# Patient Record
Sex: MALE | Race: BLACK OR AFRICAN AMERICAN | NOT HISPANIC OR LATINO | Employment: OTHER | URBAN - METROPOLITAN AREA
[De-identification: names, ages, dates, MRNs, and addresses within clinical notes are randomized per-mention and may not be internally consistent; named-entity substitution may affect disease eponyms.]

---

## 2018-12-19 ENCOUNTER — TELEPHONE (OUTPATIENT)
Dept: TRANSPLANT | Facility: CLINIC | Age: 58
End: 2018-12-19

## 2019-01-08 DIAGNOSIS — Z76.82 ORGAN TRANSPLANT CANDIDATE: Primary | ICD-10-CM

## 2019-01-24 ENCOUNTER — HOSPITAL ENCOUNTER (OUTPATIENT)
Dept: RADIOLOGY | Facility: HOSPITAL | Age: 59
Discharge: HOME | End: 2019-01-24
Attending: NURSE PRACTITIONER
Payer: MEDICARE

## 2019-01-24 ENCOUNTER — CLINICAL SUPPORT (OUTPATIENT)
Dept: INFECTIOUS DISEASES | Facility: CLINIC | Age: 59
End: 2019-01-24
Payer: MEDICARE

## 2019-01-24 ENCOUNTER — OFFICE VISIT (OUTPATIENT)
Dept: TRANSPLANT | Facility: CLINIC | Age: 59
End: 2019-01-24
Payer: MEDICARE

## 2019-01-24 ENCOUNTER — TELEPHONE (OUTPATIENT)
Dept: TRANSPLANT | Facility: CLINIC | Age: 59
End: 2019-01-24

## 2019-01-24 ENCOUNTER — SOCIAL WORK (OUTPATIENT)
Dept: TRANSPLANT | Facility: CLINIC | Age: 59
End: 2019-01-24

## 2019-01-24 VITALS
HEART RATE: 102 BPM | SYSTOLIC BLOOD PRESSURE: 157 MMHG | HEIGHT: 72 IN | OXYGEN SATURATION: 95 % | TEMPERATURE: 98 F | DIASTOLIC BLOOD PRESSURE: 88 MMHG | BODY MASS INDEX: 26.57 KG/M2 | RESPIRATION RATE: 18 BRPM | WEIGHT: 196.19 LBS

## 2019-01-24 DIAGNOSIS — Z01.818 PRE-TRANSPLANT EVALUATION FOR CHRONIC KIDNEY DISEASE: Primary | ICD-10-CM

## 2019-01-24 DIAGNOSIS — Z76.82 ORGAN TRANSPLANT CANDIDATE: ICD-10-CM

## 2019-01-24 DIAGNOSIS — Z99.2 ESRD ON HEMODIALYSIS: ICD-10-CM

## 2019-01-24 DIAGNOSIS — Z87.891 SMOKING HX: ICD-10-CM

## 2019-01-24 DIAGNOSIS — E78.49 OTHER HYPERLIPIDEMIA: ICD-10-CM

## 2019-01-24 DIAGNOSIS — I15.0 RENOVASCULAR HYPERTENSION: ICD-10-CM

## 2019-01-24 DIAGNOSIS — J44.9 CHRONIC OBSTRUCTIVE PULMONARY DISEASE, UNSPECIFIED COPD TYPE: ICD-10-CM

## 2019-01-24 DIAGNOSIS — I73.9 PAD (PERIPHERAL ARTERY DISEASE): ICD-10-CM

## 2019-01-24 DIAGNOSIS — I25.10 CORONARY ARTERY DISEASE, ANGINA PRESENCE UNSPECIFIED, UNSPECIFIED VESSEL OR LESION TYPE, UNSPECIFIED WHETHER NATIVE OR TRANSPLANTED HEART: ICD-10-CM

## 2019-01-24 DIAGNOSIS — N18.6 ESRD ON HEMODIALYSIS: ICD-10-CM

## 2019-01-24 DIAGNOSIS — N18.6 ANEMIA IN ESRD (END-STAGE RENAL DISEASE): ICD-10-CM

## 2019-01-24 DIAGNOSIS — D63.1 ANEMIA IN ESRD (END-STAGE RENAL DISEASE): ICD-10-CM

## 2019-01-24 PROCEDURE — 76770 US RETROPERITONEAL COMPLETE: ICD-10-PCS | Mod: 26,TXP,, | Performed by: RADIOLOGY

## 2019-01-24 PROCEDURE — 71046 X-RAY EXAM CHEST 2 VIEWS: CPT | Mod: TC,FY,TXP

## 2019-01-24 PROCEDURE — 76770 US EXAM ABDO BACK WALL COMP: CPT | Mod: 26,TXP,, | Performed by: RADIOLOGY

## 2019-01-24 PROCEDURE — 71046 X-RAY EXAM CHEST 2 VIEWS: CPT | Mod: 26,TXP,, | Performed by: RADIOLOGY

## 2019-01-24 PROCEDURE — 76770 US EXAM ABDO BACK WALL COMP: CPT | Mod: TC,TXP

## 2019-01-24 PROCEDURE — 99213 OFFICE O/P EST LOW 20 MIN: CPT | Mod: PBBFAC,25,TXP | Performed by: NURSE PRACTITIONER

## 2019-01-24 PROCEDURE — 99205 PR OFFICE/OUTPT VISIT, NEW, LEVL V, 60-74 MIN: ICD-10-PCS | Mod: S$PBB,TXP,, | Performed by: NURSE PRACTITIONER

## 2019-01-24 PROCEDURE — 93978 VASCULAR STUDY: CPT | Mod: 26,TXP,, | Performed by: RADIOLOGY

## 2019-01-24 PROCEDURE — 93978 US DOPP ILIACS BILATERAL: ICD-10-PCS | Mod: 26,TXP,, | Performed by: RADIOLOGY

## 2019-01-24 PROCEDURE — 72170 X-RAY EXAM OF PELVIS: CPT | Mod: TC,FY,TXP

## 2019-01-24 PROCEDURE — 71046 XR CHEST PA AND LATERAL: ICD-10-PCS | Mod: 26,TXP,, | Performed by: RADIOLOGY

## 2019-01-24 PROCEDURE — 90471 IMMUNIZATION ADMIN: CPT | Mod: PBBFAC,TXP

## 2019-01-24 PROCEDURE — 99205 OFFICE O/P NEW HI 60 MIN: CPT | Mod: S$PBB,TXP,, | Performed by: NURSE PRACTITIONER

## 2019-01-24 PROCEDURE — 99999 PR PBB SHADOW E&M-EST. PATIENT-LVL III: CPT | Mod: PBBFAC,TXP,, | Performed by: NURSE PRACTITIONER

## 2019-01-24 PROCEDURE — 93978 VASCULAR STUDY: CPT | Mod: TC,TXP

## 2019-01-24 PROCEDURE — 72170 XR PELVIS ROUTINE AP: ICD-10-PCS | Mod: 26,TXP,, | Performed by: RADIOLOGY

## 2019-01-24 PROCEDURE — 99244 OFF/OP CNSLTJ NEW/EST MOD 40: CPT | Mod: S$PBB,TXP,, | Performed by: TRANSPLANT SURGERY

## 2019-01-24 PROCEDURE — 99999 PR PBB SHADOW E&M-EST. PATIENT-LVL III: ICD-10-PCS | Mod: PBBFAC,TXP,, | Performed by: NURSE PRACTITIONER

## 2019-01-24 PROCEDURE — 99244 PR OFFICE CONSULTATION,LEVEL IV: ICD-10-PCS | Mod: S$PBB,TXP,, | Performed by: TRANSPLANT SURGERY

## 2019-01-24 PROCEDURE — 72170 X-RAY EXAM OF PELVIS: CPT | Mod: 26,TXP,, | Performed by: RADIOLOGY

## 2019-01-24 RX ORDER — OMEPRAZOLE 20 MG/1
20 CAPSULE, DELAYED RELEASE ORAL DAILY
Refills: 11 | COMMUNITY
Start: 2019-01-02

## 2019-01-24 RX ORDER — RISPERIDONE 0.5 MG/1
0.5 TABLET ORAL 2 TIMES DAILY
Refills: 1 | COMMUNITY
Start: 2019-01-17

## 2019-01-24 RX ORDER — CARVEDILOL 25 MG/1
25 TABLET ORAL 2 TIMES DAILY
Refills: 3 | COMMUNITY
Start: 2018-11-05

## 2019-01-24 RX ORDER — HYDRALAZINE HYDROCHLORIDE 50 MG/1
50 TABLET, FILM COATED ORAL 2 TIMES DAILY
Refills: 6 | COMMUNITY
Start: 2019-01-04

## 2019-01-24 RX ORDER — SERTRALINE HYDROCHLORIDE 25 MG/1
25 TABLET, FILM COATED ORAL DAILY
Refills: 1 | COMMUNITY
Start: 2018-11-01

## 2019-01-24 RX ORDER — SEVELAMER CARBONATE 800 MG/1
800 TABLET, FILM COATED ORAL 3 TIMES DAILY
COMMUNITY
Start: 2018-11-19

## 2019-01-24 RX ORDER — NIFEDIPINE 90 MG/1
90 TABLET, EXTENDED RELEASE ORAL DAILY
Refills: 3 | COMMUNITY
Start: 2018-11-05

## 2019-01-24 RX ORDER — ASCORBIC ACID, THIAMINE MONONITRATE,RIBOFLAVIN, NIACINAMIDE, PYRIDOXINE HYDROCHLORIDE, FOLIC ACID, CYANOCOBALAMIN, BIOTIN, CALCIUM PANTOTHENATE, 100; 1.5; 1.7; 20; 10; 1; 6000; 150000; 5 MG/1; MG/1; MG/1; MG/1; MG/1; MG/1; UG/1; UG/1; MG/1
1 CAPSULE, LIQUID FILLED ORAL DAILY
Refills: 11 | COMMUNITY
Start: 2019-01-02

## 2019-01-24 RX ORDER — HYDROCODONE BITARTRATE AND ACETAMINOPHEN 7.5; 325 MG/1; MG/1
1 TABLET ORAL EVERY 6 HOURS PRN
Refills: 0 | COMMUNITY
Start: 2019-01-21

## 2019-01-24 RX ORDER — SIMVASTATIN 20 MG/1
20 TABLET, FILM COATED ORAL NIGHTLY
Refills: 0 | COMMUNITY
Start: 2018-11-01

## 2019-01-24 RX ORDER — ALBUTEROL SULFATE 90 UG/1
2 AEROSOL, METERED RESPIRATORY (INHALATION)
Refills: 5 | COMMUNITY
Start: 2019-01-17

## 2019-01-24 NOTE — PROGRESS NOTES
"Pre Transplant Infectious Diseases Consult  Kidney Transplant Recipient Evaluation        Reason for Visit:    Chief Complaint   Patient presents with    Kidney Transplant Evaluation     History of Present Illness  Brandon Justice is a 58 y.o. year old Black or  male with advanced Kidney disease currently being evaluated for Kidney transplant.  Patient denies any recent fever, chills, or infective illnesses. On HD through fistula w/o problems.  Pt recently w pneumonia treated w/ abx uncertain what kind.      1) Do you have a history of:   YES NO   Diabetes      [] [x]     Diabetic Foot Infection/Bone Infection  []        [x]     Surgical Removal of Spleen   []  [x]                  2) Have you had recurrent infections involving:             YES NO  Sinus infections  []         [x]   Sore Throat   []         [x]                 Prostate Infections  []         [x]              Bladder Infections  []         [x]                     Kidney Infections  []         [x]                               Intestinal Infections  []         [x]      Skin Infections   []         [x]       Reproductive Infections          []  [x]   Periodontal Disease  []         [x]        3)Have you ever had: YES     NO UNKNOWN      Chicken Pox   [x]         []  []   Shingles   []         [x]  []   Orolabial Herpes             []  [x]  []   Genital Herpes  []         [x]  []   Cytomegalovirus  []         [x]  []   Anastacia-Barr Virus  []         [x]  []   Genital Warts   []         [x]  []   Hepatitis A   []         [x]  []   Hepatitis B   []         [x]  []   Hepatitis C   []         [x]  []   Syphilis   []         [x]  []   Gonorrhea   [x]         []  []   Pelvic Inflammatory   Disease   []         [x]  []   Chlamydia Infection  []         [x]  []   Intestinal parasites   or worms   []         [x]  []   Fungal Infections  []         [x]  []   Blood Infections  []         [x]  []       Comment:  "Clap" in the 60s - treated      4) " Have you ever been exposed   YES NO  To someone with tuberculosis?  []   [x]   If yes, what treatment did you receive:   Pt reports being in long-term for 9 months.  TB skin test negative before    5) What states have you lived in? [place], [place]    6) What countries have you visited for more than 2 weeks?    No                     YES NO  7) Did you have any associated infections?  []  [x]       8) Are you planning to travel outside the    []  [x]   [place] after your transplant?    9) Household                   YES NO  Do you have pets living in your house?    [x]         []   If yes, describe: fully immunized dog    Do you spend time or live on a farm or     [x]         []   have livestock or other farm animals?  If yes, which ones: Horse    Do you have a fish tank?          []  [x]       Do you have a litter box?      []         [x]     Do you fish or hunt?       [x]         []     Do you clean or skin fish or animals?    []         [x]     Do you consume raw or undercooked    []         [x]   meat, fish, or shellfish?      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          12)Do you garden or otherwise  YES NO   work in the soil?    [x]         []   13)Do you hike, camp, or spend     time in wooded areas?   [x]         []         14) The patient's immunization history was reviewed.    Have you ever received:  YES NO UNKNOWN DATES   Routine Childhood vaccines  [x]         []  []      Influenza vaccine   [x]  []  []    10/18   Pneumovax    [x]  []  []    10/18    Tetanus-diptheria   [x]         []  []       4 or 5 yrs ago   Hepatitis A vaccine series       []  [x]  []    Hepatitis B vaccine series         [x]  []  []           2018   Meningitis vaccine   []         []  []    Varicella vaccine   []         []  []        Based on the patients immunization history and serologies, immunizations were ordered:         Ordered  Not Ordered  Influenza Vaccine     []     [x]   Hepatitis A series at 0,  6 months   [x]    []    Rx given  Hepatitis B seriesat 0, 1, and 6 months  []    [x]   Hepatitis B High Dose 0,1, and 6 months  []    [x]     Prevnar      []    [x]    Rx Given  Pneumovax      []    [x]    TDap       []    [x]    Zoster       []    [x]   Menactra      []    [x]   Shingrix      []    [x]   Rx given       The patient was encouraged to contact us about any problems that may develop after immunization and possible side effects were reviewed.      Previous Transplant: no    Etiology of Kidney Disease: HTN    Allergies: Iodine and iodide containing products; Zithromax [azithromycin]; and Pcn [penicillins]    There is no immunization history on file for this patient.  Past Medical History:   Diagnosis Date    Anemia     Anxiety     Cardiomyopathy     Chronic idiopathic thrombocytopenia     COPD (chronic obstructive pulmonary disease)     Coronary artery disease     Depression     Disorder of kidney and ureter     Hyperlipidemia     Hypertension     PAD (peripheral artery disease)     Pneumonia     Moctezuma-Alek syndrome      Past Surgical History:   Procedure Laterality Date    arm surgery      AV FISTULA PLACEMENT      CARDIAC CATHETERIZATION        Social History     Socioeconomic History    Marital status:      Spouse name: Not on file    Number of children: Not on file    Years of education: Not on file    Highest education level: Not on file   Social Needs    Financial resource strain: Not on file    Food insecurity - worry: Not on file    Food insecurity - inability: Not on file    Transportation needs - medical: Not on file    Transportation needs - non-medical: Not on file   Occupational History    Not on file   Tobacco Use    Smoking status: Former Smoker     Packs/day: 2.00     Years: 38.00     Pack years: 76.00     Types: Cigarettes     Last attempt to quit: 1/24/2009     Years since quitting: 10.0    Smokeless tobacco:  Never Used   Substance and Sexual Activity    Alcohol use: Not on file    Drug use: Not on file    Sexual activity: Not on file   Other Topics Concern    Not on file   Social History Narrative    Not on file       Review of Systems   Constitution: Positive for decreased appetite and malaise/fatigue. Negative for chills, diaphoresis, fever, weakness, night sweats, weight gain and weight loss.   HENT: Negative for congestion, ear pain, hearing loss, sore throat, stridor and tinnitus.    Eyes: Negative for blurred vision, double vision and redness.   Cardiovascular: Negative for chest pain, leg swelling and palpitations.   Respiratory: Positive for shortness of breath and wheezing. Negative for cough, hemoptysis and sputum production.    Hematologic/Lymphatic: Negative for adenopathy. Does not bruise/bleed easily.   Skin: Negative for dry skin, itching, rash and suspicious lesions.   Musculoskeletal: Positive for arthritis and joint pain. Negative for back pain, joint swelling, myalgias and neck pain.   Gastrointestinal: Negative for abdominal pain, constipation, diarrhea, heartburn, nausea and vomiting.   Genitourinary: Negative for bladder incontinence, dysuria, flank pain, frequency, hesitancy, incomplete emptying and urgency.   Neurological: Negative for dizziness, focal weakness, headaches, loss of balance and numbness.   Psychiatric/Behavioral: Negative for depression and memory loss. The patient does not have insomnia and is not nervous/anxious.      Physical Exam   Constitutional: He is oriented to person, place, and time. He appears well-developed and well-nourished.   HENT:   Head: Normocephalic and atraumatic.   Mouth/Throat: Uvula is midline, oropharynx is clear and moist and mucous membranes are normal. He has dentures. No oral lesions. Normal dentition. No dental abscesses, lacerations or dental caries.   Eyes: Conjunctivae and lids are normal. Pupils are equal, round, and reactive to light. No  scleral icterus.   Neck: Neck supple.   Cardiovascular: Normal rate and regular rhythm. Exam reveals no gallop and no friction rub.   No murmur heard.  Pulmonary/Chest: Effort normal. No respiratory distress. He has no decreased breath sounds. He has no wheezes. He has no rhonchi. He has rales.   Abdominal: Soft. Normal appearance, normal aorta and bowel sounds are normal. He exhibits no distension and no mass. There is no hepatosplenomegaly. There is no tenderness. There is no rebound and no guarding.   Musculoskeletal: He exhibits no edema.   Lymphadenopathy:        Head (right side): No submental, no submandibular, no tonsillar, no preauricular, no posterior auricular and no occipital adenopathy present.        Head (left side): No submental, no submandibular, no tonsillar, no preauricular, no posterior auricular and no occipital adenopathy present.     He has no cervical adenopathy.     He has no axillary adenopathy.        Right: No inguinal, no supraclavicular and no epitrochlear adenopathy present.        Left: No inguinal, no supraclavicular and no epitrochlear adenopathy present.   Neurological: He is alert and oriented to person, place, and time. No cranial nerve deficit.   Skin: Skin is warm, dry and intact. No lesion and no rash noted. He is not diaphoretic. No erythema. No pallor.   Psychiatric: He has a normal mood and affect. His behavior is normal.     Diagnostics: No results found for: RPR  No results found for: CMVANTIBODIE  No results found for: HIV1X2  No results found for: HTLVIIIANTIB  No results found for: HEPBSAG  No results found for: HEPBCAB  No results found for: HEPCAB  No results found for: TOXOIGG  No components found for: TOXOIGGINTER  No results found for: YBP3ZKD  No results found for: PQE7RRE  No results found for: VARICELLAZOS  No results found for: VARICELLAINT  No results found for: STRONGANTIGG  No results found for: EPSTEINBARRV  No results found for: HEPBSAB  No results found  for: QUANTIFERON  No results found for: HEPAIGM  No results found for: PPD  No results found for this or any previous visit.         Transplant Infectious Diseases - Candidacy    Assessment/Plan:     Transplant Candidacy: Based on available information, there are no identified significant barriers to transplantation from an infectious disease standpoint pending acceptable serologies.  Final determination of transplant candidacy will be made once evaluation is complete and reviewed by the Transplant Selection Committee.  Quantiferon gold, HIV, strongyloides IgG and RPR pending. If positive, please refer to ID clinic.      - Prevnar 13 Rx given   - Hepatitis A vaccine today and in 6 months rx given  - Shingrix Day 0 and 2 months Rx given      Kamlesh Nath PA-C         Counseling:I discussed with Brandon the risk for increased susceptibility to infections following transplantation including increased risk for infection right after transplant and if rejection should occur.  The patients has been counseled on the importance of vaccinations including but not limited to a yearly flu vaccine.  Specific guidance has been provided to the patient regarding the patients occupation, hobbies and activities to avoid future infectious complications including but not limited to avoiding undercooked meats and seafood, proper hygiene, and contact with animals.

## 2019-01-24 NOTE — LETTER
January 25, 2019        Bayron Montoya  [address]  [address]  [place] [address]  Phone: [phone]  Fax: [phone]             [facility]  [address]  [facility] [address]  Phone: [phone]   Patient: Brandon Justice   MR Number: [id number]   YOB: 1960   Date of Visit: 1/24/2019       Dear Dr. Bayron Montoya    Thank you for referring Brandon Justice to me for evaluation. Attached you will find relevant portions of my assessment and plan of care.    If you have questions, please do not hesitate to call me. I look forward to following Brandon Justice along with you.    Sincerely,    Re Gooden, NP    Enclosure    If you would like to receive this communication electronically, please contact [email or web address] or [phone] to request Owned it Link access.    Owned it Link is a tool which provides read-only access to select patient information with whom you have a relationship. Its easy to use and provides real time access to review your patients record including encounter summaries, notes, results, and demographic information.    If you feel you have received this communication in error or would no longer like to receive these types of communications, please e-mail [email or web address]

## 2019-01-24 NOTE — PROGRESS NOTES
"   Transplant Nephrology  Kidney Transplant Recipient Evaluation    Referring Physician: Bayron Montoya  Current Nephrologist: Bayron Montoya    Subjective:   CC:  Initial evaluation of kidney transplant candidacy.    HPI:  Mr. Justice is a 58 y.o. year old Black or  male who has presented to be evaluated as a potential kidney transplant recipient.  He has ESRD secondary to HTN.  Patient is currently on hemodialysis started on ~ 2/2008. Patient is dialyzing on TTS schedule.  Patient reports that he is tolerating dialysis well. Dialysis session 3 1/2 hours. States BP remains stable during dialysis. Denies dialysis access clotting problems.  He has a LUE AV fistula for dialysis access.     Previous Transplant: no    Past Medical and Surgical History: Mr. Justice  has a past medical history of Anemia, Anxiety, Cardiomyopathy, Chronic idiopathic thrombocytopenia, COPD (chronic obstructive pulmonary disease), Coronary artery disease, Depression, Disorder of kidney and ureter, Hyperlipidemia, Hypertension, PAD (peripheral artery disease), Pneumonia, and Moctezuma-Alek syndrome.  He has a past surgical history that includes Cardiac catheterization; arm surgery; and AV fistula placement.    Past Social and Family History: Mr. Justice reports that he quit smoking about 10 years ago. His smoking use included cigarettes. He has a 76.00 pack-year smoking history. he has never used smokeless tobacco. His family history includes Diabetes in his brother and sister; Heart disease in his brother, father, and mother; Hypertension in his brother, father, mother, and sister; Stroke in his mother.      CAD, s/p angiogram in 5/2018  Noting CAD, PAD w/ significant disease in the bilateral internal iliac arteries. Common and external noted to be "fine."  F/u with [facility] [place]  Listed for 12 year [facility]     Smoking HX  Started smoking at age 10 and quit at age 48. Reports smoking ~ 2 packs per " day                   Reported per [facility] a 30+ pack year smoking HX w/ COPD  F/U with DR Woodall / pulmonologist in [place], [place]  The patient states he is getting over pneumonia ~ 1 month ago. He completed antibiotics. He is f/u with his PCP, Dr Weldon.         Reports Kris Alek Syndrome reaction  on 2 occassions due iodine contrast  Last reaction was after getting heart cath in 5/2018  Reports Hospitalization at [facility]      FX assessment   Does not appear frail  Keeps busy , works in a body shop and paints cars.   He continues to do yard work and takes care of his horse.   Denies SOB , chest pain or leg pain.      Donors--no  Kidney bx-- reports bx at [facility] ~ 1 1/2 years ago      Past Medical History:   Diagnosis Date    Anemia     Anxiety     Cardiomyopathy     Chronic idiopathic thrombocytopenia     COPD (chronic obstructive pulmonary disease)     Coronary artery disease     Depression     Disorder of kidney and ureter     Hyperlipidemia     Hypertension     PAD (peripheral artery disease)     Pneumonia     Moctezuma-Alek syndrome        Review of Systems   Constitutional: Negative for activity change, appetite change, chills, fatigue, fever and unexpected weight change.   HENT: Negative for congestion, facial swelling, postnasal drip, rhinorrhea, sinus pressure, sore throat and trouble swallowing.    Eyes: Negative for pain, redness and visual disturbance.   Respiratory: Positive for cough. Negative for chest tightness, shortness of breath and wheezing.         COPD w/ chronic cough   Cardiovascular: Positive for leg swelling. Negative for chest pain and palpitations.   Gastrointestinal: Negative for abdominal pain, diarrhea, nausea and vomiting.   Genitourinary: Positive for decreased urine volume. Negative for dysuria, flank pain and urgency.         Still makes urine   Musculoskeletal: Positive for back pain. Negative for gait problem, neck pain and neck stiffness.  "  Skin: Negative for rash.        Dry skin    Allergic/Immunologic: Negative for environmental allergies, food allergies and immunocompromised state.   Neurological: Negative for dizziness, weakness, light-headedness and headaches.   Psychiatric/Behavioral: Negative for agitation and confusion. The patient is not nervous/anxious.        Objective:   Blood pressure (!) 157/88, pulse 102, temperature 98.3 °F (36.8 °C), temperature source Oral, resp. rate 18, height 5' 11.65" (1.82 m), weight 89 kg (196 lb 3.4 oz), SpO2 95 %.body mass index is 26.87 kg/m².    Physical Exam   Constitutional: He is oriented to person, place, and time. He appears well-developed and well-nourished.   HENT:   Head: Normocephalic.   Mouth/Throat: Oropharynx is clear and moist. No oropharyngeal exudate.   Eyes: Conjunctivae and EOM are normal. Pupils are equal, round, and reactive to light. No scleral icterus.   Neck: Normal range of motion. Neck supple.   Cardiovascular: Normal rate, regular rhythm and normal heart sounds.   Pulmonary/Chest: Effort normal. He has rales in the left lower field.   Abdominal: Soft. Normal appearance and bowel sounds are normal. He exhibits no distension and no mass. There is no splenomegaly or hepatomegaly. There is no tenderness. There is no rebound, no guarding, no CVA tenderness, no tenderness at McBurney's point and negative Vincent's sign.   Musculoskeletal: Normal range of motion. He exhibits no edema.        Arms:  Lymphadenopathy:     He has no cervical adenopathy.   Neurological: He is alert and oriented to person, place, and time. He exhibits normal muscle tone. Coordination normal.   Skin: Skin is warm and dry.   Psychiatric: He has a normal mood and affect. His behavior is normal.   Vitals reviewed.      Labs:  Lab Results   Component Value Date    WBC 4.68 01/24/2019    HGB 8.1 (L) 01/24/2019    HCT 26.8 (L) 01/24/2019     01/24/2019    K 4.4 01/24/2019     01/24/2019    CO2 30 (H) " 01/24/2019    BUN 26 (H) 01/24/2019    CREATININE 9.3 (H) 01/24/2019    EGFRNONAA 5.5 (A) 01/24/2019    CALCIUM 10.1 01/24/2019    PHOS 4.1 01/24/2019    ALBUMIN 3.1 (L) 01/24/2019    AST 16 01/24/2019    ALT 13 01/24/2019    .0 (H) 01/24/2019       No results found for: PREALBUMIN, BILIRUBINUA, GGT, AMYLASE, LIPASE, PROTEINUA, NITRITE, RBCUA, WBCUA    No results found for: HLAABCTYPE    Labs were reviewed with the patient.    Assessment:     1. Pre-transplant evaluation for chronic kidney disease    2. ESRD on hemodialysis    3. Renovascular hypertension    4. Anemia in ESRD (end-stage renal disease)    5. Other hyperlipidemia    6. Coronary artery disease, angina presence unspecified, unspecified vessel or lesion type, unspecified whether native or transplanted heart    7. Chronic obstructive pulmonary disease, unspecified COPD type    8. Smoking hx    9. PAD (peripheral artery disease)        Plan:   Cardiology clearance--> CAD w/ angina, HX ABN stress test. Dr Fraga in [place]    PAD--> PXR, iliac doppler study    HX smoking w/ COPD  30+ pack years --> chest CT without contrast , PFTs, 6 minute walk test  pulm consult, Dr Woodall in [place]    Get records:  Kidney bx ~ 1 1/2 years ago at [facility] 5/2018 after angiogram / Kris Alek reaction to iodine contrast    Kidney allocation scheme was also discussed with the patient.  Patient was advised that the average wait time for a kidney in [place] is 3-5 years     Kidney donor profile index (KPDI) and estimated post-transplant survival scores (EPTS) were reviewed. The benefits and risks of accepting a kidney with KDPI > 85% were explained to the patient. Patient verbalized understanding and consented to accept a kidney with KDPI > 85%       The side effects of immunosuppressants were reviewed that include but are not limited to the risk of infection, the risks of cancer (skin and lymphoma being most common), the risk of  diabetes associated with prednisone use, acceleration of heart disease and diarrhea( this being more common post transplant).     Reviewed the hospital stay.  Reviewed the post transplant follow up.  Reviewed the importance of maintaining a good weight.  Reviewed the importance of controlling BP.  Reviewed the importance of following the renal diet.      Transplant Candidacy:   Based on available information, Mr. Justice is a high-risk kidney transplant candidate. D/t CAD, PAD.   Meets center eligibility for accepting HCV+ donor offer - yes.  Patient educated on HCV+ donors. Brandon is willing to accept HCV+ donor offer - yes   Patient is a candidate for KDPI > 85 kidney donor offer - no d/t weight.  Final determination of transplant candidacy will be made once workup is complete and reviewed by the selection committee.    Re Gooden, YADIRA       UNOS Patient Status  Functional Status: 60% - Requires occasional assistance but is able to care for needs  Physical Capacity: No Limitations

## 2019-01-24 NOTE — TELEPHONE ENCOUNTER
Reviewed pt transplant labs.  Notified dialysis unit dietitian of the following abnormal labs via fax.   Alb 3.1

## 2019-01-24 NOTE — PROGRESS NOTES
PHARM.D. PRE-TRANSPLANT NOTE:    This patient's medication therapy was evaluated as part of his pre-transplant evaluation.    The following pharmacologic concerns were noted: Patient currently on hydrocodone/apap (back pain).    This patient's medication profile was reviewed for contraindications for DAA Hepatitis C therapy:    [X]  No current inducers of CYP 3A4 or PGP  [X]  No amiodarone on this patient's EMR profile in the last 24 months  [X]  No past or current atrial fibrillation on this patient's EMR profile       Current Outpatient Medications   Medication Sig Dispense Refill    carvedilol (COREG) 25 MG tablet Take 25 mg by mouth 2 (two) times daily.  3    hydrALAZINE (APRESOLINE) 50 MG tablet Take 50 mg by mouth 2 (two) times daily.  6    HYDROcodone-acetaminophen (NORCO) 7.5-325 mg per tablet Take 1 tablet by mouth every 6 (six) hours as needed for Pain.  0    NIFEdipine (PROCARDIA-XL) 90 MG (OSM) 24 hr tablet Take 90 mg by mouth once daily.  3    omeprazole (PRILOSEC) 20 MG capsule Take 20 mg by mouth once daily.  11    RENAL CAPS 1 mg Cap Take 1 mg by mouth once daily.  11    RENVELA 800 mg Tab Take 800 mg by mouth 3 (three) times daily.      risperiDONE (RISPERDAL) 0.5 MG Tab Take 0.5 mg by mouth 2 (two) times daily.  1    sertraline (ZOLOFT) 25 MG tablet Take 25 mg by mouth once daily.  1    simvastatin (ZOCOR) 20 MG tablet Take 20 mg by mouth every evening.  0    VENTOLIN HFA 90 mcg/actuation inhaler 2 puffs every 4 to 6 hours as needed.  5     No current facility-administered medications for this visit.          Currently the patient is responsible for preparing / administering this patient's medications on a daily basis.  I am available for consultation and can be contacted, as needed by the other members of the Kidney Transplant team.

## 2019-01-24 NOTE — PROGRESS NOTES
Transplant Surgery  Kidney Transplant Recipient Evaluation    Referring Physician: Bayron Montoya  Current Nephrologist: Bayron Montoya    Subjective:     Reason for Visit: evaluate transplant candidacy    History of Present Illness: Brandon Justice is a 58 y.o. year old male undergoing transplant evaluation.    Dialysis History: Brandon is on hemodialysis.      Transplant History: N/A    Etiology of Renal Disease:  (based on medical records from referral).    Review of Systems   Constitutional: Negative for activity change, appetite change, chills, diaphoresis, fatigue, fever and unexpected weight change.   HENT: Negative for congestion, dental problem, ear pain, facial swelling, mouth sores, nosebleeds, sore throat, tinnitus, trouble swallowing and voice change.    Eyes: Negative for photophobia, pain and visual disturbance.   Respiratory: Negative for apnea, cough, choking, chest tightness and shortness of breath.    Cardiovascular: Negative for chest pain, palpitations and leg swelling.   Gastrointestinal: Negative for abdominal distention, abdominal pain, blood in stool, constipation, diarrhea, nausea and vomiting.   Endocrine: Negative for cold intolerance and heat intolerance.   Genitourinary: Negative for difficulty urinating, dysuria, flank pain, hematuria and urgency.   Musculoskeletal: Negative for arthralgias and gait problem.   Skin: Negative for color change, pallor and rash.   Neurological: Negative for dizziness, tremors, seizures, syncope and light-headedness.   Hematological: Negative for adenopathy. Does not bruise/bleed easily.   Psychiatric/Behavioral: Negative for agitation and confusion.       Objective:     Physical Exam:  Constitutional:   Vitals reviewed: yes   Well-nourished and well-groomed: yes  Eyes:   Sclerae icteric: no   Extraocular movements intact: yes  GI:    Bowel sounds normal: yes   Tenderness: no    If yes, quadrant/location: not applicable   Palpable masses: no    If yes,  quadrant/location: not applicable   Hepatosplenomegaly: no   Ascites: no   Hernia: no    If yes, type/location: not applicable   Surgical scars: no    If yes, type/location: not applicable  Resp:   Effort normal: yes   Breath sounds normal: yes    CV:   Regular rate and rhythm: yes   Heart sounds normal: yes   Femoral pulses normal: yes   Extremities edematous: no  Skin:   Rashes or lesions present: no    If yes, describe:not applicable   Jaundice:: no    Musculoskeletal:   Gait normal: yes   Strength normal: yes  Psych:   Oriented to person, place, and time: yes   Affect and mood normal: yes    Additional comments: not applicable    Counseling: We provided Brandon Justice with a group education session today.  We discussed kidney transplantation at length with him, including risks, potential complications, and alternatives in the management of his renal failure.  The discussion included complications related to anesthesia, bleeding, infection, primary nonfunction, and ATN.  I discussed the typical postoperative course, length of hospitalization, the need for long-term immunosuppression, and the need for long-term routine follow-up.  I discussed living-donor and -donor transplantation and the relative advantages and disadvantages of each.  I also discussed average waiting times for both living donation and  donation.  I discussed national and center-specific survival rates.  I also mentioned the potential benefit of multicenter listing to candidates listed with centers within more than one organ procurement organization.  All questions were answered.    Final determination of transplant candidacy will be made once evaluation is complete and reviewed by the Kidney & Kidney/Pancreas Selection Committee.         Transplant Surgery - Candidacy   Assessment/Plan:   Brandon Justice has end stage renal disease (ESRD) on dialysis. I see no surgical contraindication to placing a kidney transplant. Based on  available information, Brandon Justice is a suitable kidney transplant candidate.     Belle Yoon MD

## 2019-01-24 NOTE — PROGRESS NOTES
INITIAL PATIENT EDUCATION NOTE    Mr. Brandon Justice was seen in pre-kidney transplant clinic for evaluation for kidney, kidney/pancreas or pancreas only transplant.  The patient attended a group education session that discussed/reviewed the following aspects of transplantation: evaluation and selection committee process, UNOS waitlist management/multiple listings, types of organs offered (KDPI < 85%, KDPI > 85%, PHS increased risk, DCD, HCV+), financial aspects, surgical procedures, dietary instruction pre- and post-transplant, health maintenance pre- and post-transplant, post-transplant hospitalization and outpatient follow-up, potential to participate in a research protocol, and medication management and side effects.  A question and answer session was provided after the presentation.    The patient was seen by all members of the multi-disciplinary team to include: Nephrologist/PA, Surgeon, , Transplant Coordinator, , Pharmacist and Dietician (if applicable).    The patient reviewed and signed all consents for evaluation which were witnessed and sent to scanning into the EPIC chart.    The patient was given an education book and plan for further evaluation based on his individual assessment.      The patient was encouraged to call with any questions or concerns.

## 2019-01-24 NOTE — PROGRESS NOTES
Transplant Recipient Adult Psychosocial Assessment    Brandon Vinh  [address] [address]  Telephone Information:   Mobile [phone]   Home  [phone] (home)  Work  There is no work phone number on file.  E-mail  No e-mail address on record    Sex: male  YOB: 1960  Age: 58 y.o.    Encounter Date: 1/24/2019  [place] Citizen: yes  Primary Language: English   Needed: no    Emergency Contact:  Name: Kasi Hylton  Relationship: daughter  Address: [address] [place] [address]  Phone Numbers: [phone]    Family/Social Support:   Number of dependents/: Pt denies having any dependents.   Marital history: Pt reports being  to his wife Lina Watters for 30 years.  Other family dynamics: Pt reports having 4 adult children: Dontarious Renee, Mayda Jay (40), Kasi Hylton (35) and Ivania Hylton (30). Pt reports having 12 grandchildren (1 is Kasi's child and 7 are Mayda's children). Pt's wife cares for all of the grandchildren. Pt reports having 1 sister and 5 brothers. Pt reports everyone in his family is supportive of his decision.     Household Composition:  Name: Lina Watters   Age: 50's   Relationship: wife  Does person drive? yes    Do you and your caregivers have access to reliable transportation? yes   PRIMARY CAREGIVER: Kasi Hylton, pt's daughter, will be primary caregiver, phone number [phone].      provided in-depth information to patient and caregiver regarding pre- and post-transplant caregiver role.   strongly encourages patient and caregiver to have concrete plan regarding post-transplant care giving, including back-up caregiver(s) to ensure care giving needs are met as needed.    Patient and Caregiver states understanding all aspects of caregiver role/commitment and is able/willing/committed to being caregiver to the fullest extent necessary.    Patient and Caregiver verbalizes understanding of the education  provided today and caregiver responsibilities.         remains available. Patient and Caregiver agree to contact  in a timely manner if concerns arise.      Able to take time off work without financial concerns: yes.     Additional Significant Others who will Assist with Transplant:  Name: Mayda Jay  Age: 40  City: [place] State: [place]  Relationship: daughter  Does person drive? yes    Name: Lina Watters  Age: 50s  City: [place]: [place]  Relationship: wife  Does person drive? yes    Living Will: no  Healthcare Power of : no  Advance Directives on file: <<no information> per medical record.  Verbally reviewed LW/HCPA information.   provided patient with copy of LW/HCPA documents and provided education on completion of forms.    Living Donors: Pt reports having some friends and family interested in donating. SWI provided information and education around living donors.     Highest Education Level: High School (9-12) or GED  Reading Ability: 12th grade  Reports difficulty with: Pt denies any difficulties   Learns Best By:  Verbal instruction      Status: no  VA Benefits: no     Working for Income: No  If no, reason not working: Disability  Patient is disabled.  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    Spouse/Significant Other Employment: Pt reports wife is disabled due to heart problems and had a triple bypass 1 year ago. Pt reports wife has been disabled since 2004. Pt reports wife is independent with ADLS but since she cares for all of the grandchildren she is unable to serve as the primary caregiver.     Disabled: yes: date disability began: 2007, due to: ESRD.    Monthly Income:  SS Disability: $771  Food [place]: $192  Other household members total: $691 pt reports this is what wife receives for disability   Able to afford all costs now and if transplanted, including medications: yes  Patient and Caregiver verbalizes  understanding of personal responsibilities related to transplant costs and the importance of having a financial plan to ensure that patients transplant costs are fully covered.      provided fundraising information/education.  Patient and Caregiver verbalizes understanding.   remains available.    Insurance:   Payor/Plan Subscr  Sex Relation Sub. Ins. ID Effective Group Num   1. [place]* GABRIEL NASSAR 1960 Male  [id number]                                    [address]   2. [place] GABRI[place] NASSAR 1960 Male  [id number]                                    [address] [address]     Primary Insurance (for UNOS reporting): Public Insurance - Medicare FFS (Fee For Service)  Secondary Insurance (for UNOS reporting): Public Insurance - Medicaid   Pt and Caregiver report understanding that Medicare will terminate coverage three years after transplant if disability is solely based on ESRD.   Patient and Caregiver verbalizes clear understanding that patient may experience difficulty obtaining and/or be denied insurance coverage post-surgery. This includes and is not limited to disability insurance, life insurance, health insurance, burial insurance, long term care insurance, and other insurances.    Patient and Caregiver also reports understanding that future health concerns related to or unrelated to transplantation may not be covered by patient's insurance.  Resources and information provided and reviewed.      Patient and Caregiver provides verbal permission to release any necessary information to outside resources for patient care and discharge planning.  Resources and information provided are reviewed.      Dialysis Adherence:  Patient reports high dialysis compliance.  Dialysis center didn't answer so SWI will follow up with them later. Please see other note.     Infusion Service: patient utilizing? no  Home Health: patient utilizing? no  DME: yes BP cuff,  "oximeter, CPAP  Pulmonary/Cardiac Rehab: pt denies   ADLS:  Pt reports being independent with all ADLS    Adherence: Pt reports high adherence with health care plan.  Adherence education and counseling provided.     Per History Section:  Past Medical History:   Diagnosis Date    Anemia     Anxiety     Cardiomyopathy     Chronic idiopathic thrombocytopenia     COPD (chronic obstructive pulmonary disease)     Coronary artery disease     Depression     Disorder of kidney and ureter     Hyperlipidemia     Hypertension     PAD (peripheral artery disease)     Pneumonia     Moctezuma-Alek syndrome      Social History     Tobacco Use    Smoking status: Former Smoker     Packs/day: 2.00     Years: 38.00     Pack years: 76.00     Types: Cigarettes     Last attempt to quit: 1/24/2009     Years since quitting: 10.0    Smokeless tobacco: Never Used   Substance Use Topics    Alcohol use: Not on file     Social History     Substance and Sexual Activity   Drug Use Not on file     Social History     Substance and Sexual Activity   Sexual Activity Not on file       Per Today's Psychosocial:  Tobacco: Pt reports doing a can of chewing tobacco every other day. SWI offered resources to quit and pt's daughter reports pt is stubborn and knows how to quit. Pt reports he will quit.   Alcohol: none, patient denies any use.  Illicit Drugs/Non-prescribed Medications: Pt reports last smoking marijuana 12 years ago. Pt reports knowing not to smoke and hasn't since he's been diagnosed with ESRD.     Patient and Caregiver states clear understanding of the potential impact of substance use as it relates to transplant candidacy and is aware of possible random substance screening.  Substance abstinence/cessation counseling, education and resources provided and reviewed.     Arrests/DWI/Treatment/Rehab: patient denies    Psychiatric History:    Mental Health: Pt denies any feelings of anxiety or depression. Pt reports "just living " "with it" and coping well.   Psychiatrist/Counselor: Pt denies seeing a psychiatrist or counselor.   Medications:  Pt denies taking any medications for his mental health.   Suicide/Homicide Issues: Pt denies any feelings of SI/HI.   Safety at home: Pt denies any history of sexual, verbal, emotional or physical abuse.     Knowledge: Patient and Caregiver states having clear understanding and realistic expectations regarding the potential risks and potential benefits of organ transplantation and organ donation, agrees to discuss with health care team members and support system members and to utilize available resources and express questions and/or concerns in order to further facilitate the pt informed decision-making.  Resources and information provided and reviewed.     Patient and Caregiver is aware of [facility] affiliation and/or partnership with agencies in home health care, LTAC, SNF, [facility], and other hospitals and clinics.    Understanding: Patient and Caregiver reports having a clear understanding of the many lifetime commitments involved with being a transplant recipient, including costs, compliance, medications, lab work, procedures, appointments, concrete and financial planning, preparedness, timely and appropriate communication of concerns, abstinence (ETOH, tobacco, illicit non-prescribed drugs), adherence to all health care team recommendations, support system and caregiver involvement, appropriate and timely resource utilization and follow-through, mental health counseling as needed/recommended, and patient and caregiver responsibilities.  Social Service Handbook, resources and detailed educational information provided and reviewed.  Educational information provided.    Patient and Caregiver also reports current and expected compliance with health care regime and states having a clear understanding of the importance of compliance.      Patient and Caregiver reports a clear understanding that risks and " benefits may be involved with organ transplantation and with organ donation.      Patient and Caregiver also reports clear understanding that psychosocial risk factors may affect patient, and include but are not limited to feelings of depression, generalized anxiety, anxiety regarding dependence on others, post traumatic stress disorder, feelings of guilt and other emotional and/or mental concerns, and/or exacerbation of existing mental health concerns.  Detailed resources provided and discussed.     Patient and Caregiver agrees to access appropriate resources in a timely manner as needed and/or as recommended, and to communicate concerns appropriately.  Patient and Caregiver also reports a clear understanding of treatment options available.      reviewed education, provided additional information, and answered questions.    Feelings or Concerns: Pt reports being on dialysis for 11 years and being advised by his nephrologist at [facility] in [place] to pursue [facility]. Pt reports having a very clear understanding of the transplant process and denies any concerns or questions.     Coping: Pt reports coping well with the aid of family support, working with his cars and attending [facility] in [place], [place].     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      Interview Behavior: Patient and Caregiver presents as alert and oriented x 4, pleasant, good eye contact, well groomed, recall good, concentration/judgement good, average intelligence, calm, communicative, cooperative and asking and answering questions appropriately. Pt presented with his daughter, Kasi Hylton.          Transplant Social Work - Candidacy  Assessment/Plan:     Psychosocial Suitability: Patient presents as a good candidate for kidney transplant at this time. Based on psychosocial risk factors, patient presents as low risk, due to a strong caregiver plan, a  financial plan and no psychosocial needs or concerns. .    Recommendations/Additional Comments: SWI recommends pt conduct fundraising to assist in the transplant process. SWI recommends that pt remain aware of potential mental health concerns and contact the team if any concerns arise. SWI recommends that pt remain abstinent from tobacco, ETOH and drug use. SWI support pt's continued dialysis adherence and will continue to check on dialysis adherence. SWI remains available to answer any questions or concerns that arise as the pt moves through the transplant process. Psychosocial completed under supervision of ROSEANNE Weldon.       ROSALBA Roman Intern

## 2019-01-25 ENCOUNTER — TELEPHONE (OUTPATIENT)
Dept: TRANSPLANT | Facility: CLINIC | Age: 59
End: 2019-01-25

## 2019-01-25 NOTE — TELEPHONE ENCOUNTER
Dialysis Adherence:    Roxana nurse at 's dialysis unit reports over the last three months that patient has had 0 AMAs, 0 no shows and no issues with taking medications, transportation or caregiver support. Nurse had no concerns or questions.     SWI remains available at [phone].

## 2019-02-08 ENCOUNTER — TELEPHONE (OUTPATIENT)
Dept: TRANSPLANT | Facility: CLINIC | Age: 59
End: 2019-02-08

## 2019-02-08 NOTE — TELEPHONE ENCOUNTER
Nutritional assessment from dialysis unit received and reviewed--no nutritional changes to plan needed at this time.  Pt to continue to follow-up with renal dietitians recommendations.

## 2019-05-01 ENCOUNTER — TELEPHONE (OUTPATIENT)
Dept: TRANSPLANT | Facility: CLINIC | Age: 59
End: 2019-05-01

## 2019-05-01 NOTE — TELEPHONE ENCOUNTER
After extensive record review I am still missing reports for chest CT, ECHO, Stress test and 6 min walk. I also do not have a pulmonary clearance. Mr Justice states that Moira, at the dialysis unit, may have reports and can help with this. I called and spoke with Moira and she will work on this for Mr Justice. Mr Justice has a pulmonary appt on 5/21 so I will request clearance from Dr Woodall.

## 2019-05-01 NOTE — TELEPHONE ENCOUNTER
I called and spoke with Dr Woodall's office staff about clearance for kidney transplant to be faxed to my office along with the visit note. Mr Justice being seen on 5/21/19.

## 2019-05-01 NOTE — TELEPHONE ENCOUNTER
----- Message from Delia Perez sent at 4/29/2019  4:10 PM CDT -----      ----- Message -----  From: Marysol Turcios  Sent: 4/29/2019  12:41 PM  To: [facility] Pre-Kidney Transplant Clinical    Pt calling for update on his case    Pt contact [phone]

## 2019-07-19 ENCOUNTER — COMMITTEE REVIEW (OUTPATIENT)
Dept: TRANSPLANT | Facility: CLINIC | Age: 59
End: 2019-07-19

## 2019-07-19 NOTE — LETTER
July 19, 2019    Brandon Justice  [address] [place] [id number]        Dear Brandon Vinh:  MRN: [id number]    It is the duty of the [facility] Selection [facility] to determine which patients are candidates for a transplant. For this reason, our committee has the difficult task of evaluating patients to determine which ones have the greatest chance of having a successful transplant. We are aware of the magnitude of this responsibility, and we approach it with reverence and humility.    It is with regret I inform you that you are not approved as a transplant candidate due to calcifications in your blood vessels which prohibit being able to connect a kidney transplant. Based on this review, we have determined that at this time, you are not a candidate for a transplant at [facility].      The selection committee carefully considers each patient's transplant candidacy and determines whether it is safe to proceed with transplantation on a case-by-case basis using established selection criteria.  At present, the risk of proceeding with an elective transplant surgery has become too high.                                                                               Although the selection committee believes you are not a suitable transplant candidate, you have the option to be evaluated at other transplant centers who may have different selection criteria.  You may request your [facility] records be sent to any center of your choice by contacting our Medical Records Department at [phone].                                                                               Attached is a letter from the [employer]aring (UNOS).  It describes the services and information offered to patients by UNOS and the Organ Procurement and Transplant Network.    The [facility] sincerely wishes you the best and remains available to answer any questions.  Please do not hesitate to contact our  pre-transplant office if we can assist you in any other way.                                                                               Sincerely,      Abida Cross MD  Medical Director, Kidney & Kidney/Pancreas Transplantation    Faxed to:  Bayron Montoya MD                   [facility].    Encl: UNOS Letter                  OPTN/UNOS: Your Resource for Organ Transplant Information        If you have a question regarding your own medical care, you always should call your transplant center first. However, for general organ transplant-related information, you can call the [employer] (UNOS) toll-free patient services line at 1-273.188.2626.    Anyone, including potential transplant candidates, recipients, family members/friends, living donors, and/or donor family members can call this number to:    · talk about organ donation, living donation, how transplant and donation work, the donation process, transplant policies, and transplant/donor information;  · get a free patient information kit with helpful booklets, waiting list and transplant information, and a list of all transplant centers;  · ask questions about the Organ Procurement and Transplantation Network (OPTN) web site (www.optn.transplant.hrsa.gov); the UNOS Web site (www.unos.org); or the UNOS web site for living donors and transplant recipients (www.transplantliving.org);  · learn how UNOS and the OPTN can help you;  · talk about any concerns that you may have with a transplant center and how they perform    UNOS is a not-for-profit organization that provides all of the administrative services for the national OPTN under federal contract to the Health Resources and Services Administration (HRSA), an agency under the U.S. Department of Health and Human Services (HHS).     UNOS and OPTN responsibilities include:    · writing educational material for patients, the public and professionals;  · helping to make  people aware of the need for donated organs and tissue;  · writing organ transplant policy with help from doctors, nurses, transplant patients/candidates, donor families, living donors, and the public;  · coordinating the organ matching and placement process;  · collecting information about every organ transplant and donation that occurs in the [place].    Remember, you should contact your transplant center directly if you have questions or concerns about your own medical care including medical records, work-up progress and test reports. [facility] is not your transplant center, and staff at [facility] will not be able to transfer you to your transplant center, so keep your transplant centers phone number handy. But, while you research your transplant needs and learn as much as you can about transplantation and donation, we welcome your call to our toll-free patient services line at [phone].

## 2019-07-19 NOTE — COMMITTEE REVIEW
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    I called Mr Justice however got voicemail. The mailbox was full.    Note written by Radha Jackson RN    ===============================================    I was present at the meeting and attest to the decision of the committee.    Arlette Celaya MD

## 2019-07-23 ENCOUNTER — TELEPHONE (OUTPATIENT)
Dept: TRANSPLANT | Facility: CLINIC | Age: 59
End: 2019-07-23

## 2019-07-23 NOTE — TELEPHONE ENCOUNTER
----- Message from Az Meza RN sent at 7/22/2019 10:56 AM CDT -----  Contact: SLIME Mills       ----- Message -----  From: Abida Vidales  Sent: 7/22/2019   8:44 AM  To: [facility] Pre-Kidney Transplant Clinical    Patient Returning Call from [facility]    Who Left Message for Patient: Radha CARTAGENA     Communication Preference: [phone]    Additional Information: would l like to discuss a missed phone call received on Friday

## 2019-07-23 NOTE — TELEPHONE ENCOUNTER
I was able to reach  Vinh himself and reviewed the committee decision. He verbalized understanding.

## 2019-07-23 NOTE — TELEPHONE ENCOUNTER
I attempted to return this call however got voicemail and it was full so could not leave a message.